# Patient Record
Sex: MALE | Race: WHITE | NOT HISPANIC OR LATINO | URBAN - METROPOLITAN AREA
[De-identification: names, ages, dates, MRNs, and addresses within clinical notes are randomized per-mention and may not be internally consistent; named-entity substitution may affect disease eponyms.]

---

## 2017-10-18 PROBLEM — 266569009 BENIGN PROSTATIC HYPERTROPHY: Status: ACTIVE | Noted: 2017-10-18

## 2021-08-28 ENCOUNTER — TELEPHONE ENCOUNTER (OUTPATIENT)
Dept: URBAN - METROPOLITAN AREA CLINIC 13 | Facility: CLINIC | Age: 56
End: 2021-08-28

## 2021-08-29 ENCOUNTER — TELEPHONE ENCOUNTER (OUTPATIENT)
Dept: URBAN - METROPOLITAN AREA CLINIC 13 | Facility: CLINIC | Age: 56
End: 2021-08-29

## 2021-08-29 RX ORDER — ACYCLOVIR 400 MG/1
TABLET ORAL
Status: ACTIVE | COMMUNITY

## 2023-11-03 ENCOUNTER — OUT OF OFFICE VISIT (OUTPATIENT)
Dept: URBAN - METROPOLITAN AREA SURGERY CENTER 27 | Facility: SURGERY CENTER | Age: 58
End: 2023-11-03
Payer: COMMERCIAL

## 2023-11-03 DIAGNOSIS — K57.30 DIVERTICULA, COLON: ICD-10-CM

## 2023-11-03 DIAGNOSIS — K64.8 HEMORRHOIDS, INTERNAL: ICD-10-CM

## 2023-11-03 DIAGNOSIS — Z12.11 COLON CANCER SCREENING: ICD-10-CM

## 2023-11-03 DIAGNOSIS — Z12.11 COLON CANCER SCREENING (HIGH RISK): ICD-10-CM

## 2023-11-03 PROCEDURE — 45378 DIAGNOSTIC COLONOSCOPY: CPT | Performed by: INTERNAL MEDICINE

## 2023-11-03 PROCEDURE — 00811 ANES LWR INTST NDSC NOS: CPT | Performed by: NURSE ANESTHETIST, CERTIFIED REGISTERED

## 2023-11-03 PROCEDURE — G8907 PT DOC NO EVENTS ON DISCHARG: HCPCS | Performed by: INTERNAL MEDICINE

## 2023-11-03 RX ORDER — ACYCLOVIR 400 MG/1
TABLET ORAL
Status: ACTIVE | COMMUNITY

## 2024-09-10 ENCOUNTER — LAB OUTSIDE AN ENCOUNTER (OUTPATIENT)
Dept: URBAN - METROPOLITAN AREA CLINIC 48 | Facility: CLINIC | Age: 59
End: 2024-09-10

## 2024-09-10 ENCOUNTER — DASHBOARD ENCOUNTERS (OUTPATIENT)
Age: 59
End: 2024-09-10

## 2024-09-10 ENCOUNTER — OFFICE VISIT (OUTPATIENT)
Dept: URBAN - METROPOLITAN AREA CLINIC 48 | Facility: CLINIC | Age: 59
End: 2024-09-10
Payer: COMMERCIAL

## 2024-09-10 VITALS
HEIGHT: 71 IN | WEIGHT: 209.8 LBS | TEMPERATURE: 97.9 F | HEART RATE: 62 BPM | SYSTOLIC BLOOD PRESSURE: 137 MMHG | BODY MASS INDEX: 29.37 KG/M2 | DIASTOLIC BLOOD PRESSURE: 86 MMHG

## 2024-09-10 DIAGNOSIS — R13.19 ESOPHAGEAL DYSPHAGIA: ICD-10-CM

## 2024-09-10 DIAGNOSIS — R79.89 ELEVATED LFTS: ICD-10-CM

## 2024-09-10 DIAGNOSIS — R10.12 LEFT UPPER QUADRANT ABDOMINAL PAIN: ICD-10-CM

## 2024-09-10 PROCEDURE — 99204 OFFICE O/P NEW MOD 45 MIN: CPT | Performed by: INTERNAL MEDICINE

## 2024-09-10 RX ORDER — TAMSULOSIN HYDROCHLORIDE 0.4 MG/1
1 CAPSULE CAPSULE ORAL ONCE A DAY
Status: ACTIVE | COMMUNITY

## 2024-09-10 RX ORDER — ACYCLOVIR 400 MG/1
1 TABLET TABLET ORAL TWICE A DAY
Status: ACTIVE | COMMUNITY

## 2024-09-10 RX ORDER — NALTREXONE HYDROCHLORIDE 50 MG/1
1 TABLET TABLET, FILM COATED ORAL ONCE A DAY
Status: ACTIVE | COMMUNITY

## 2024-09-10 RX ORDER — PANTOPRAZOLE SODIUM 40 MG/1
1 TABLET TABLET, DELAYED RELEASE ORAL
Qty: 90 TABLET | Refills: 3 | OUTPATIENT
Start: 2024-09-10

## 2024-09-10 NOTE — HPI-TODAY'S VISIT:
58-year-old male presents for evaluation LUQ abdominal pain x 1-2 years. Pain is dull, but is sometimes aggravated after meals or during times of stress. Sometimes his pain is alleviated with BM. Denies N/V, heartburn/indigestion, early satiety, loss of weight or appetite, melena. Patient states his BMs are regular, formed on daily fiber supplement. He has occasional dysphagia to solids (chicken), but barium swallow was negative per patient; he denies regurgitation. He is on aspirin 81mg daily, and rarely takes other NSAIDs for HA. Patient reports his PCP ordered CT scan, MRI, abdominal XR, which were all negative. Colonoscopy 11/3/2023 with diverticulosis, internal hemorrhoids, otherwise normal.  Recommended repeat colon in 10 years.  Labs 6/2021 with slightly elevated ALT at 47, otherwise unremarkable. No more recent labs available, but he states he had CBC with PCP Dr. Ayad Rinaldi in 11/2023. Recent LFTs in 4/2024 were normal per patient. He reports consuming 3-5 alcoholic drinks every day, and recently started naltrexone to help him decrease EtOH intake.

## 2024-10-02 ENCOUNTER — OFFICE VISIT (OUTPATIENT)
Dept: URBAN - METROPOLITAN AREA SURGERY CENTER 28 | Facility: SURGERY CENTER | Age: 59
End: 2024-10-02
Payer: COMMERCIAL

## 2024-10-02 ENCOUNTER — CLAIMS CREATED FROM THE CLAIM WINDOW (OUTPATIENT)
Dept: URBAN - METROPOLITAN AREA CLINIC 4 | Facility: CLINIC | Age: 59
End: 2024-10-02
Payer: COMMERCIAL

## 2024-10-02 DIAGNOSIS — K29.70 CHRONIC ACITVE GASTRITIS (H.PYLORI NEGATIVE): ICD-10-CM

## 2024-10-02 DIAGNOSIS — R13.19 CERVICAL DYSPHAGIA: ICD-10-CM

## 2024-10-02 DIAGNOSIS — K90.0 CELIAC DISEASE: ICD-10-CM

## 2024-10-02 DIAGNOSIS — R10.12 ABDOMINAL BURNING SENSATION IN LEFT UPPER QUADRANT: ICD-10-CM

## 2024-10-02 DIAGNOSIS — K21.9 ACID REFLUX: ICD-10-CM

## 2024-10-02 DIAGNOSIS — K29.70 GASTRITIS, UNSPECIFIED, WITHOUT BLEEDING: ICD-10-CM

## 2024-10-02 DIAGNOSIS — K90.0 ACD (ADULT CELIAC DISEASE): ICD-10-CM

## 2024-10-02 DIAGNOSIS — K29.70 GASTRITIS: ICD-10-CM

## 2024-10-02 DIAGNOSIS — K21.9 GASTRO-ESOPHAGEAL REFLUX DISEASE WITHOUT ESOPHAGITIS: ICD-10-CM

## 2024-10-02 PROCEDURE — 88305 TISSUE EXAM BY PATHOLOGIST: CPT | Performed by: PATHOLOGY

## 2024-10-02 PROCEDURE — 88342 IMHCHEM/IMCYTCHM 1ST ANTB: CPT | Performed by: PATHOLOGY

## 2024-10-02 PROCEDURE — 00731 ANES UPR GI NDSC PX NOS: CPT | Performed by: ANESTHESIOLOGY

## 2024-10-02 PROCEDURE — 43239 EGD BIOPSY SINGLE/MULTIPLE: CPT | Performed by: INTERNAL MEDICINE

## 2024-10-02 PROCEDURE — 88312 SPECIAL STAINS GROUP 1: CPT | Performed by: PATHOLOGY

## 2024-10-02 RX ORDER — ACYCLOVIR 400 MG/1
1 TABLET TABLET ORAL TWICE A DAY
Status: ACTIVE | COMMUNITY

## 2024-10-02 RX ORDER — PANTOPRAZOLE SODIUM 40 MG/1
1 TABLET TABLET, DELAYED RELEASE ORAL
Qty: 90 TABLET | Refills: 3 | Status: ACTIVE | COMMUNITY
Start: 2024-09-10

## 2024-10-02 RX ORDER — NALTREXONE HYDROCHLORIDE 50 MG/1
1 TABLET TABLET, FILM COATED ORAL ONCE A DAY
Status: ACTIVE | COMMUNITY

## 2024-10-02 RX ORDER — TAMSULOSIN HYDROCHLORIDE 0.4 MG/1
1 CAPSULE CAPSULE ORAL ONCE A DAY
Status: ACTIVE | COMMUNITY

## 2025-02-04 ENCOUNTER — OFFICE VISIT (OUTPATIENT)
Dept: URBAN - METROPOLITAN AREA CLINIC 44 | Facility: CLINIC | Age: 60
End: 2025-02-04
Payer: COMMERCIAL

## 2025-02-04 ENCOUNTER — LAB OUTSIDE AN ENCOUNTER (OUTPATIENT)
Dept: URBAN - METROPOLITAN AREA CLINIC 44 | Facility: CLINIC | Age: 60
End: 2025-02-04

## 2025-02-04 VITALS
HEART RATE: 66 BPM | TEMPERATURE: 97.7 F | BODY MASS INDEX: 29.57 KG/M2 | WEIGHT: 211.2 LBS | DIASTOLIC BLOOD PRESSURE: 96 MMHG | HEIGHT: 71 IN | SYSTOLIC BLOOD PRESSURE: 156 MMHG

## 2025-02-04 DIAGNOSIS — Z12.11 SCREENING COLON CANCER (LOW RISK): ICD-10-CM

## 2025-02-04 DIAGNOSIS — K90.0 CELIAC DISEASE: ICD-10-CM

## 2025-02-04 PROBLEM — 396331005: Status: ACTIVE | Noted: 2025-02-04

## 2025-02-04 PROCEDURE — 99214 OFFICE O/P EST MOD 30 MIN: CPT | Performed by: INTERNAL MEDICINE

## 2025-02-04 RX ORDER — NALTREXONE HYDROCHLORIDE 50 MG/1
1 TABLET TABLET, FILM COATED ORAL ONCE A DAY
COMMUNITY

## 2025-02-04 RX ORDER — TAMSULOSIN HYDROCHLORIDE 0.4 MG/1
1 CAPSULE CAPSULE ORAL ONCE A DAY
COMMUNITY

## 2025-02-04 RX ORDER — ACYCLOVIR 400 MG/1
1 TABLET TABLET ORAL TWICE A DAY
COMMUNITY

## 2025-02-04 RX ORDER — PANTOPRAZOLE SODIUM 40 MG/1
1 TABLET TABLET, DELAYED RELEASE ORAL
Qty: 90 TABLET | Refills: 3 | COMMUNITY
Start: 2024-09-10

## 2025-02-04 NOTE — PHYSICAL EXAM CARDIOVASCULAR:
PHYSICAL THERAPY EVALUATION  Type of Visit: Evaluation    See electronic medical record for Abuse and Falls Screening details.    Subjective       Presenting condition or subjective complaint: back and hips  Date of onset: 09/29/23    Relevant medical history: Arthritis   Dates & types of surgery:      Prior diagnostic imaging/testing results: MRI     Prior therapy history for the same diagnosis, illness or injury: No      Prior Level of Function  Transfers:   Ambulation:   ADL:   IADL:     Living Environment  Social support: With a significant other or spouse   Type of home: House; 2-story   Stairs to enter the home: Yes   Is there a railing: Yes   Ramp: No   Stairs inside the home: Yes 13 Is there a railing: Yes   Help at home: None  Equipment owned:       Employment: No    Hobbies/Interests:      Subjective Summary: He reports Dr. Silver gave him some exercises such as supine piriformis stretching and cat cow.  However after the exercises and after the steroid he gets less symptoms down his legs. He reports he previously has symptoms going down his right side and right sided low back pain.    Patient goals for therapy: walk any distance    Pain assessment: Location: Low Back Pain with Right Radicular pain/Rating: Not terrible today     Objective   LUMBAR SPINE EVALUATION  PAIN: Pain is Exacerbated By: Walking, Standing  Pain is Relieved By: rest  Pain Progression: Unchanged  INTEGUMENTARY (edema, incisions):   POSTURE:   GAIT:   Weightbearing Status:   Assistive Device(s):   Gait Deviations:   BALANCE/PROPRIOCEPTION:   WEIGHTBEARING ALIGNMENT:   NON-WEIGHTBEARING ALIGNMENT:    ROM:  Mild limitation in lumbar flexion, mild limitation in lumbar extension, mild limitation in side bends bilaterall  PELVIC/SI SCREEN:   STRENGTH: WNL, All 5/5 with no myotomal loss  MDT: Lumbar Flexion Repeated in Standing: NE / NE. Lumbar Extension Repeated in Standing: NE / NE. Lumbar Extension RADHA: Worse, no centralization or  no edema, no murmurs, regular rate and rhythm peripheralization, no effect after.  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  The following significant findings have been identified: Pain, Decreased ROM/flexibility, Decreased strength, Impaired gait, and Decreased activity tolerance. These impairments interfere with their ability to perform self care tasks, work tasks, recreational activities, household chores, driving , household mobility, and community mobility as compared to previous level of function.     Clinical Decision Making (Complexity):  Clinical Presentation: Stable/Uncomplicated  Clinical Presentation Rationale: based on medical and personal factors listed in PT evaluation  Clinical Decision Making (Complexity): Low complexity    PLAN OF CARE  Treatment Interventions:  Interventions: Gait Training, Manual Therapy, Neuromuscular Re-education, Therapeutic Activity, Therapeutic Exercise    Long Term Goals     PT Goal 1  Goal Identifier: Ambulation  Goal Description: Patient will be able to walk 500 feet without low back pain developed to collect his mail, while also being able to walk 1000 feet with mild low back pain no worse than 3/10 that does not radiate down his right leg  Rationale: to maximize safety and independence with performance of ADLs and functional tasks;to maximize safety and independence within the home;to maximize safety and independence within the community;to maximize safety and independence with transportation;to maximize safety and independence with self cares  Goal Progress:  Currently unable to ambulate 250 feet to his mailbox and 250 feet feet back without sitting and resting due to back pain  Target Date: 12/11/23      Frequency of Treatment: 1x a week  Duration of Treatment: 6 weeks    Recommended Referrals to Other Professionals: Physical Therapy  Education Assessment:        Risks and benefits of evaluation/treatment have been explained.   Patient/Family/caregiver agrees with Plan of Care.     Evaluation Time:             Signing Clinician: NOEL [facility]ron [facility]                                                                                   OUTPATIENT PHYSICAL THERAPY      PLAN OF TREATMENT FOR OUTPATIENT REHABILITATION   Patient's Last Name, First Name, IZAHelenPAULIEHelen  JeredavaniLee COUCH YOB: 1955   Provider's Name   [facility]   Medical Record No.  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                Referring Provider:  David Silver, DO      Initial Assessment  See Epic Evaluation- Start of Care Date: 10/24/23

## 2025-02-04 NOTE — HPI-TODAY'S VISIT:
59-year-old male presents for evaluation LUQ abdominal pain x 1-2 years. He denies diarrhea in the past. After EGD he started the gluten free diet and feels now he is having smaller stool 3 times a day. Pain is better. It is less frequent. He did not see a dietician.  Sometimes his pain is alleviated with BM. Denies N/V, heartburn/indigestion, early satiety, loss of weight or appetite, melena. Patient states his BMs are regular, formed on daily fiber supplement. He has occasional dysphagia to solids (chicken), but barium swallow was negative per patient; he denies regurgitation. He is on aspirin 81mg daily, and rarely takes other NSAIDs for HA. Patient reports his PCP ordered CT scan, MRI, abdominal XR, which were all negative.No family history of celiac disease.   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 The small bowel was congested. Biopsies show flat mucosa consistent with celiac disease. Cross Type 3C. Colonoscopy 11/3/2023 with diverticulosis, internal hemorrhoids, otherwise normal.  Recommended repeat colon in 10 years.  Labs 6/2021 with slightly elevated ALT at 47, otherwise unremarkable. No more recent labs available, but he states he had CBC with PCP Dr. Ayad Rinaldi in 11/2023. Recent LFTs in 4/2024 were normal per patient. He reports consuming 3-5 alcoholic drinks every day, and recently started naltrexone to help him decrease EtOH intake.

## 2025-02-11 LAB
DQ2 (DQA1 0501/0505,DQB1 02XX): POSITIVE
DQ8 (DQA1 03XX, DQB1 0302): NEGATIVE
HLA DQB1*: 602
HLA VARIANTS DETECTED: HLA DQA1*: 5
IMMUNOGLOBULIN A, QN, SERUM: 226
INTERPRETATION: (no result)
INTERPRETATION: (no result)
RESULTS REVIEWED BY:: (no result)
T-TRANSGLUTAMINASE (TTG) IGA: 152.4

## 2025-05-06 ENCOUNTER — OFFICE VISIT (OUTPATIENT)
Dept: URBAN - METROPOLITAN AREA CLINIC 44 | Facility: CLINIC | Age: 60
End: 2025-05-06

## 2025-06-10 ENCOUNTER — OFFICE VISIT (OUTPATIENT)
Dept: URBAN - METROPOLITAN AREA CLINIC 44 | Facility: CLINIC | Age: 60
End: 2025-06-10

## 2025-06-10 RX ORDER — NALTREXONE HYDROCHLORIDE 50 MG/1
1 TABLET TABLET, FILM COATED ORAL ONCE A DAY
Status: ACTIVE | COMMUNITY

## 2025-06-10 RX ORDER — TAMSULOSIN HYDROCHLORIDE 0.4 MG/1
1 CAPSULE CAPSULE ORAL ONCE A DAY
Status: ACTIVE | COMMUNITY

## 2025-06-10 RX ORDER — PANTOPRAZOLE SODIUM 40 MG/1
1 TABLET TABLET, DELAYED RELEASE ORAL
Qty: 90 TABLET | Refills: 3 | Status: ACTIVE | COMMUNITY
Start: 2024-09-10

## 2025-06-10 RX ORDER — ACYCLOVIR 400 MG/1
1 TABLET TABLET ORAL TWICE A DAY
Status: ACTIVE | COMMUNITY

## 2025-07-29 ENCOUNTER — OFFICE VISIT (OUTPATIENT)
Dept: URBAN - METROPOLITAN AREA CLINIC 44 | Facility: CLINIC | Age: 60
End: 2025-07-29

## 2025-08-18 ENCOUNTER — ERX REFILL RESPONSE (OUTPATIENT)
Dept: URBAN - METROPOLITAN AREA CLINIC 48 | Facility: CLINIC | Age: 60
End: 2025-08-18

## 2025-08-18 RX ORDER — PANTOPRAZOLE SODIUM 40 MG/1
1 TABLET TABLET, DELAYED RELEASE ORAL
Qty: 90 TABLET | Refills: 3 | OUTPATIENT

## 2025-08-18 RX ORDER — PANTOPRAZOLE SODIUM 40 MG/1
1 TABLET TABLET, DELAYED RELEASE ORAL
Qty: 30 | Refills: 0 | OUTPATIENT

## 2025-08-19 ENCOUNTER — OFFICE VISIT (OUTPATIENT)
Dept: URBAN - METROPOLITAN AREA CLINIC 48 | Facility: CLINIC | Age: 60
End: 2025-08-19
Payer: COMMERCIAL

## 2025-08-19 DIAGNOSIS — K21.9 GASTROESOPHAGEAL REFLUX DISEASE, UNSPECIFIED WHETHER ESOPHAGITIS PRESENT: ICD-10-CM

## 2025-08-19 DIAGNOSIS — G47.30 SLEEP APNEA, UNSPECIFIED TYPE: ICD-10-CM

## 2025-08-19 DIAGNOSIS — K90.0 CELIAC DISEASE: ICD-10-CM

## 2025-08-19 DIAGNOSIS — F10.90 ALCOHOL USE: ICD-10-CM

## 2025-08-19 PROCEDURE — 99213 OFFICE O/P EST LOW 20 MIN: CPT | Performed by: INTERNAL MEDICINE

## 2025-08-19 RX ORDER — ACYCLOVIR 400 MG/1
1 TABLET TABLET ORAL TWICE A DAY
Status: ACTIVE | COMMUNITY

## 2025-08-19 RX ORDER — TAMSULOSIN HYDROCHLORIDE 0.4 MG/1
1 CAPSULE CAPSULE ORAL ONCE A DAY
Status: ACTIVE | COMMUNITY

## 2025-08-19 RX ORDER — PANTOPRAZOLE SODIUM 40 MG/1
1 TABLET TABLET, DELAYED RELEASE ORAL
Qty: 30 | Refills: 0 | Status: ACTIVE | COMMUNITY

## 2025-08-19 RX ORDER — PANTOPRAZOLE SODIUM 40 MG/1
1 TABLET TABLET, DELAYED RELEASE ORAL
Qty: 90 TABLET | Refills: 3